# Patient Record
Sex: MALE | Race: WHITE | Employment: STUDENT | URBAN - NONMETROPOLITAN AREA
[De-identification: names, ages, dates, MRNs, and addresses within clinical notes are randomized per-mention and may not be internally consistent; named-entity substitution may affect disease eponyms.]

---
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
## 2022-01-27 ENCOUNTER — PROCEDURE VISIT (OUTPATIENT)
Dept: ENT CLINIC | Age: 22
End: 2022-01-27

## 2022-01-27 ENCOUNTER — OFFICE VISIT (OUTPATIENT)
Dept: ENT CLINIC | Age: 22
End: 2022-01-27
Payer: COMMERCIAL

## 2022-01-27 VITALS — TEMPERATURE: 98.2 F | BODY MASS INDEX: 36.07 KG/M2 | WEIGHT: 238 LBS | HEIGHT: 68 IN

## 2022-01-27 DIAGNOSIS — H60.312 CHRONIC DIFFUSE OTITIS EXTERNA OF LEFT EAR: Primary | ICD-10-CM

## 2022-01-27 DIAGNOSIS — H61.22 IMPACTED CERUMEN OF LEFT EAR: ICD-10-CM

## 2022-01-27 DIAGNOSIS — H60.312 CHRONIC DIFFUSE OTITIS EXTERNA OF LEFT EAR: ICD-10-CM

## 2022-01-27 PROCEDURE — 69210 REMOVE IMPACTED EAR WAX UNI: CPT | Performed by: OTOLARYNGOLOGY

## 2022-01-27 PROCEDURE — 99213 OFFICE O/P EST LOW 20 MIN: CPT | Performed by: OTOLARYNGOLOGY

## 2022-01-27 RX ORDER — CLOTRIMAZOLE 1 G/ML
SOLUTION TOPICAL
Qty: 1 EACH | Refills: 2 | Status: SHIPPED | OUTPATIENT
Start: 2022-01-27

## 2022-01-27 NOTE — PROGRESS NOTES
25 y.o.  male presents today with persistent left ear problems. He has been back to his PCP a couple of times since his last visit with me. His mother is brought in  and try to clean the left ear at home. She states she has gotten a few chunks of debris out. He continues to have fullness in the left ear. History reviewed. No pertinent family history. Social History     Socioeconomic History    Marital status: Unknown     Spouse name: None    Number of children: None    Years of education: None    Highest education level: None   Occupational History    None   Tobacco Use    Smoking status: Never Smoker    Smokeless tobacco: Never Used   Vaping Use    Vaping Use: Never used   Substance and Sexual Activity    Alcohol use: Never    Drug use: Never    Sexual activity: None   Other Topics Concern    None   Social History Narrative    None     Social Determinants of Health     Financial Resource Strain:     Difficulty of Paying Living Expenses: Not on file   Food Insecurity:     Worried About Running Out of Food in the Last Year: Not on file    Stanley of Food in the Last Year: Not on file   Transportation Needs:     Lack of Transportation (Medical): Not on file    Lack of Transportation (Non-Medical):  Not on file   Physical Activity:     Days of Exercise per Week: Not on file    Minutes of Exercise per Session: Not on file   Stress:     Feeling of Stress : Not on file   Social Connections:     Frequency of Communication with Friends and Family: Not on file    Frequency of Social Gatherings with Friends and Family: Not on file    Attends Mandaen Services: Not on file    Active Member of Clubs or Organizations: Not on file    Attends Club or Organization Meetings: Not on file    Marital Status: Not on file   Intimate Partner Violence:     Fear of Current or Ex-Partner: Not on file    Emotionally Abused: Not on file    Physically Abused: Not on file    Sexually Abused: Not on file   Housing Stability:     Unable to Pay for Housing in the Last Year: Not on file    Number of Places Lived in the Last Year: Not on file    Unstable Housing in the Last Year: Not on file     History reviewed. No pertinent past medical history. History reviewed. No pertinent surgical history. REVIEW OF SYSTEMS:  all other systems reviewed and are negative  General Health: no change in health status since last visit  Ears: recent drainage Yes  Left ear popping/ fullness Yes  Left constant       Comments:     PHYSICAL EXAM:    Temp 98.2 °F (36.8 °C)   Ht 5' 8\" (1.727 m)   Wt (!) 238 lb (108 kg)   BMI 36.19 kg/m²   Body mass index is 36.19 kg/m². General Appearance: well developed , well nourished and husky  Head/ Face: normocephalic and atraumatic  Vocal Quality: good/ normal  Ears: Right Ear: External: external ears normal Otoscopy Ear Canal: canal clear Otoscopy TM: TM's mobile and TM's intact Left Ear: External: external ears normal Otoscopy Ear Canal: cerumen, edema and fungal elements/ discharge Otoscopy TM: Thickened  Neuro: alert and oriented x3  Psych/ Mood: cooperative    Assessment & Plan:    Problem List Items Addressed This Visit        ENT Problems    Chronic diffuse otitis externa of left ear     Persistent discharge in left ear. Appears to have fungal elements. Ear cleaned  Boric acid powder applied  We will start on Lotrimin drops  Patient will return next week for additional debridement. Jarret Dawson will be available. He can adjust medications depending on patient's response to therapy         Impacted cerumen of left ear     Cerumen and fungal elements obstructing left ear canal.  Will clean.          Relevant Orders    HI REMOVAL IMPACTED CERUMEN INSTRUMENTATION UNILAT (Completed)          Orders Placed This Encounter   Procedures    HI REMOVAL IMPACTED CERUMEN INSTRUMENTATION UNILAT     Under microscopic guidance cerumen and was appears to be fungal elements obstructing the left ear canal were suctioned. There was mild swelling of the external canal.  The TM is somewhat thickened. Orders Placed This Encounter   Medications    clotrimazole (LOTRIMIN) 1 % external solution     Si-6 drops in draining ear 3-4 times daily for 2 weeks     Dispense:  1 each     Refill:  2             Please note that this chart was generated using dragon dictation software. Although every effort was made to ensure the accuracy of this automated transcription, some errors in transcription may have occurred.

## 2022-01-27 NOTE — ASSESSMENT & PLAN NOTE
Persistent discharge in left ear. Appears to have fungal elements. Ear cleaned  Boric acid powder applied  We will start on Lotrimin drops  Patient will return next week for additional debridement. Jarret Dawson will be available.   He can adjust medications depending on patient's response to therapy

## 2022-02-02 ENCOUNTER — OFFICE VISIT (OUTPATIENT)
Dept: ENT CLINIC | Age: 22
End: 2022-02-02
Payer: COMMERCIAL

## 2022-02-02 VITALS
WEIGHT: 235 LBS | BODY MASS INDEX: 34.8 KG/M2 | DIASTOLIC BLOOD PRESSURE: 78 MMHG | SYSTOLIC BLOOD PRESSURE: 124 MMHG | HEIGHT: 69 IN

## 2022-02-02 DIAGNOSIS — H62.42 OTITIS EXTERNA, FUNGAL, LEFT EAR: Primary | ICD-10-CM

## 2022-02-02 DIAGNOSIS — B36.9 OTITIS EXTERNA, FUNGAL, LEFT EAR: Primary | ICD-10-CM

## 2022-02-02 PROCEDURE — 99213 OFFICE O/P EST LOW 20 MIN: CPT | Performed by: PHYSICIAN ASSISTANT

## 2022-02-02 NOTE — PROGRESS NOTES
Jonathon Vela is a pleasant 79-year-old  male that presents for 2-week follow-up due to fungal otitis externa to the left ear. He reports that the ear feels much improved with no drainage or problems. Microscopic exam demonstrated resolution of the otitis externa with a small area of fungal otitis externa noted at the inferior canal.  This was suction down to the TM with no issues. The right ear demonstrated normal TM and canal with no evidence of infection. Neck exam demonstrated no lymphadenopathy. Impression: Resolving left-sided otitis externafungal    Plan: Advised the patient to continue with the Lotrimin drops for another 10 days. At this point he is to follow-up with me as needed. They were reminded to call if they have any questions or problems.       Electronically signed by Justin Paez PA-C on 2/2/22 at 10:32 AM CST